# Patient Record
Sex: MALE | Race: OTHER | NOT HISPANIC OR LATINO | Employment: FULL TIME | URBAN - METROPOLITAN AREA
[De-identification: names, ages, dates, MRNs, and addresses within clinical notes are randomized per-mention and may not be internally consistent; named-entity substitution may affect disease eponyms.]

---

## 2020-10-16 ENCOUNTER — OFFICE VISIT (OUTPATIENT)
Dept: URGENT CARE | Facility: PHYSICIAN GROUP | Age: 45
End: 2020-10-16
Payer: COMMERCIAL

## 2020-10-16 VITALS
OXYGEN SATURATION: 98 % | HEIGHT: 77 IN | SYSTOLIC BLOOD PRESSURE: 130 MMHG | WEIGHT: 302 LBS | HEART RATE: 90 BPM | TEMPERATURE: 97.4 F | DIASTOLIC BLOOD PRESSURE: 78 MMHG | RESPIRATION RATE: 16 BRPM | BODY MASS INDEX: 35.66 KG/M2

## 2020-10-16 DIAGNOSIS — K42.9 UMBILICAL HERNIA WITHOUT OBSTRUCTION AND WITHOUT GANGRENE: ICD-10-CM

## 2020-10-16 DIAGNOSIS — K40.90 NON-RECURRENT UNILATERAL INGUINAL HERNIA WITHOUT OBSTRUCTION OR GANGRENE: ICD-10-CM

## 2020-10-16 PROCEDURE — 99203 OFFICE O/P NEW LOW 30 MIN: CPT | Performed by: PHYSICIAN ASSISTANT

## 2020-10-17 ENCOUNTER — HOSPITAL ENCOUNTER (OUTPATIENT)
Dept: RADIOLOGY | Facility: MEDICAL CENTER | Age: 45
End: 2020-10-17
Attending: PHYSICIAN ASSISTANT
Payer: COMMERCIAL

## 2020-10-17 DIAGNOSIS — K40.90 NON-RECURRENT UNILATERAL INGUINAL HERNIA WITHOUT OBSTRUCTION OR GANGRENE: ICD-10-CM

## 2020-10-17 PROCEDURE — 76857 US EXAM PELVIC LIMITED: CPT

## 2020-10-17 ASSESSMENT — ENCOUNTER SYMPTOMS
CHILLS: 0
ABDOMINAL PAIN: 1
DIZZINESS: 0
DIARRHEA: 0
SEIZURES: 0
SPEECH CHANGE: 0
TINGLING: 0
SHORTNESS OF BREATH: 0
FEVER: 0
SENSORY CHANGE: 0
CLAUDICATION: 0
NAUSEA: 0
COUGH: 0
ORTHOPNEA: 0
VOMITING: 0
PALPITATIONS: 0
HEADACHES: 0
BLURRED VISION: 0
WEAKNESS: 0
FOCAL WEAKNESS: 0
LOSS OF CONSCIOUSNESS: 0
TREMORS: 0
DOUBLE VISION: 0

## 2020-10-17 NOTE — PROGRESS NOTES
Subjective:   Sadia Sarmiento is a 45 y.o. male who presents for Hernia (x2 days, pt states hernia in the groin )      Patient is a 45-year-old male who presents for evaluation of possible hernia.  He has had a painful lump on the right side of the groin for 2 days.  He also has a chronic umbilical hernia.  He denies nausea vomiting fevers.      Review of Systems   Constitutional: Negative for chills and fever.   Eyes: Negative for blurred vision and double vision.   Respiratory: Negative for cough and shortness of breath.    Cardiovascular: Negative for chest pain, palpitations, orthopnea, claudication and leg swelling.   Gastrointestinal: Positive for abdominal pain. Negative for diarrhea, nausea and vomiting.   Skin: Negative for rash.   Neurological: Negative for dizziness, tingling, tremors, sensory change, speech change, focal weakness, seizures, loss of consciousness, weakness and headaches.   All other systems reviewed and are negative.      Medications:    • cyclobenzaprine Tabs  • ibuprofen Tabs    Allergies: Patient has no known allergies.    Problem List: Sadia Sarmiento has Prepatellar bursitis of left knee on their problem list.    Surgical History:  Past Surgical History:   Procedure Laterality Date   • KNEE ARTHROSCOPY Left 12/8/2015    Procedure: LEFT KNEE ARTHROSCOPY, IRRIGATION AND DEBRIDEMENT OF PREPATELLAR BURSITIS ;  Surgeon: Jcarlos Hull M.D.;  Location: SURGERY [facility];  Service:    • OTHER ORTHOPEDIC SURGERY      right ankle       Past Social Hx: Sadia Sarmiento  reports that he has been smoking. He does not have any smokeless tobacco history on file. He reports current alcohol use. He reports current drug use. Drug: Inhaled.     Past Family Hx:  Sadia Sarmiento family history is not on file.     Problem list, medications, and allergies reviewed by myself today in Epic.     Objective:     Blood Pressure 130/78   Pulse 90   Temperature 36.3 °C (97.4 °F) (Temporal)   Respiration 16    "Height 1.956 m (6' 5\")   Weight (Abnormal) 137 kg (302 lb)   Oxygen Saturation 98%   Body Mass Index 35.81 kg/m²     Physical Exam  Vitals signs reviewed.   Constitutional:       General: He is not in acute distress.     Appearance: Normal appearance. He is well-developed. He is not ill-appearing, toxic-appearing or diaphoretic.   HENT:      Head: Normocephalic and atraumatic.      Right Ear: External ear normal.      Left Ear: External ear normal.      Nose: Nose normal.   Eyes:      General: Lids are normal.      Conjunctiva/sclera: Conjunctivae normal.   Neck:      Musculoskeletal: Full passive range of motion without pain, normal range of motion and neck supple.   Cardiovascular:      Rate and Rhythm: Normal rate and regular rhythm.      Heart sounds: Normal heart sounds, S1 normal and S2 normal. No murmur. No friction rub. No gallop.    Pulmonary:      Effort: Pulmonary effort is normal. No respiratory distress.      Breath sounds: Normal breath sounds. No decreased breath sounds, wheezing or rales.   Chest:      Chest wall: No tenderness.   Abdominal:      General: Bowel sounds are normal. There is no distension or abdominal bruit.      Palpations: Abdomen is soft. Abdomen is not rigid. There is no shifting dullness, fluid wave, hepatomegaly, splenomegaly, mass or pulsatile mass.      Tenderness: There is abdominal tenderness. There is no right CVA tenderness, left CVA tenderness, guarding or rebound. Negative signs include Stevenson's sign, Rovsing's sign, McBurney's sign, psoas sign and obturator sign.      Hernia: A hernia is present.      Comments: Abdomen: PTP in the RLQ not out of proportion.  Soft and nondistended. Normal bowel sounds. No hepatosplenomegaly or masses, or hernias. No rebound or guarding.     Musculoskeletal: Normal range of motion.         General: No tenderness or deformity.   Skin:     General: Skin is warm and dry.      Findings: No bruising, ecchymosis or erythema.   Neurological:    "   Mental Status: He is alert and oriented to person, place, and time.   Psychiatric:         Speech: Speech normal.         Behavior: Behavior normal.         Thought Content: Thought content normal.         Judgment: Judgment normal.         Assessment/Plan:     Medical Decision Making/Comments   Patient is a 45-year-old male who presents for evaluation of possible hernia.  On exam he has pain to palpation over the right inguinal canal.  There is no erythema.  Ultrasound was ordered but is pending at this time.  Will refer to general surgery.  If symptoms worsen he is to follow-up with emergency department.     Diagnosis and associated orders     1. Non-recurrent unilateral inguinal hernia without obstruction or gangrene  REFERRAL TO GENERAL SURGERY    US-INGUINAL HERNIA              Differential diagnosis, natural history, supportive care, and indications for immediate follow-up discussed.    Advised the patient to follow-up with the primary care physician for recheck, reevaluation, and consideration of further management.    Please note that this dictation was created using voice recognition software. I have made a reasonable attempt to correct obvious errors, but I expect that there are errors of grammar and possibly content that I did not discover before finalizing the note.